# Patient Record
Sex: MALE | Employment: STUDENT | URBAN - METROPOLITAN AREA
[De-identification: names, ages, dates, MRNs, and addresses within clinical notes are randomized per-mention and may not be internally consistent; named-entity substitution may affect disease eponyms.]

---
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
## 2023-01-17 ENCOUNTER — OFFICE VISIT (OUTPATIENT)
Dept: FAMILY MEDICINE CLINIC | Facility: CLINIC | Age: 18
End: 2023-01-17

## 2023-01-17 VITALS
OXYGEN SATURATION: 98 % | HEIGHT: 68 IN | WEIGHT: 124.2 LBS | HEART RATE: 70 BPM | RESPIRATION RATE: 18 BRPM | TEMPERATURE: 97.3 F | BODY MASS INDEX: 18.82 KG/M2 | DIASTOLIC BLOOD PRESSURE: 60 MMHG | SYSTOLIC BLOOD PRESSURE: 110 MMHG

## 2023-01-17 DIAGNOSIS — R55 NEAR SYNCOPE: Primary | ICD-10-CM

## 2023-01-17 NOTE — LETTER
January 17, 2023     Patient: Carmelo Senior  YOB: 2005  Date of Visit: 1/17/2023      To Whom it May Concern:    Carmelo Senior is under my professional care  Cary Barry was seen in my office on 1/17/2023  Cary Jhonny may return to school/work on 1/18/2023  If you have any questions or concerns, please don't hesitate to call           Sincerely,          Massiel Butler,         CC: No Recipients

## 2023-01-17 NOTE — PROGRESS NOTES
Name: Karly Lopez      : 2005      MRN: [id number]  Encounter Provider: Pedrito Aceves DO  Encounter Date: 2023   Encounter department: [facility]     1  Near syncope  -       Approximately 1-2 hours a no work he began to feel cold and numb like he might pass out  His complete by nausea and headache  Summary symptoms stopped after a nap when he got home however the headache improved after eating the next morning  Patient was able to finish a complete work day which was the full 8 hours  He did state that he would often find his eyes closed and not remember when he close them cetera  He was seated when the symptoms started  -         Some of the symptoms do sound reminiscent of an anxiety component however patient does not have history of anxiety or any significant stressors  - patient had a eaten that morning and did not appear to be at high risk of hypoglycemia nor does he have history of the same  - patient has no cardiac history known however  Cannot rule out an arrhythmia without EKG  -     POCT ECG  Revealed no alarming findings and was consistent with normal sinus rhythm without acute ischemic changes or apparent arrhythmia  -      Patient reassured this may have been a 1 time incident that  May not recur, weight may simply be his 1st migraine is a was a headache and nausea both reported  -       Patient to resume regular visit schedule but call sooner if symptoms occur or new symptoms arise           Subjective      Works as , 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  The other day was a longer work day 8hrs total instead 2-3 normal but symptoms started after 1-2 hours  He felt cold numb felt like going to pass out tired all of a sudden nausea, head ache (L side occipital)  In general symptoms stopped after nap after work  The headache stopped after eating mac and cheese in morning   He takes tylenol or advil before work and had ensure, vitamins - then chicken soup at lunch that day  In general he has no unusual stressors or medical history of relevant significance  He does sometimes take 5 hour energy/concentrated B12 supplements but had not been taking this recently  Did recently meet 1 of his online friends and person they went to date from [employer] there were no unusual exposures reported  Review of Systems   Constitutional: Positive for fatigue  Negative for chills and fever  HENT: Negative for ear pain, rhinorrhea, sore throat and trouble swallowing  Eyes: Negative for pain and visual disturbance  Respiratory: Negative for cough and shortness of breath  Cardiovascular: Negative for chest pain and palpitations  Gastrointestinal: Positive for nausea  Negative for abdominal pain and vomiting  Genitourinary: Negative for dysuria and hematuria  Musculoskeletal: Negative for arthralgias and back pain  Skin: Negative for color change and rash  Neurological: Positive for light-headedness and headaches  Negative for seizures and syncope  All other systems reviewed and are negative  Current Outpatient Medications on File Prior to Visit   Medication Sig   • Melatonin 10 MG CAPS Take 10 mg by mouth   • guanFACINE (TENEX) 1 mg tablet Take 1 mg by mouth every morning 1/2 tab       Objective     /60 (BP Location: Left arm, Patient Position: Sitting, Cuff Size: Standard)   Pulse 70   Temp (!) 97 3 °F (36 3 °C) (Tympanic)   Resp 18   Ht 5' 7 5" (1 715 m)   Wt 56 3 kg (124 lb 3 2 oz)   SpO2 98%   BMI 19 17 kg/m²     Physical Exam  Constitutional:       Appearance: Normal appearance  He is normal weight  He is not ill-appearing, toxic-appearing or diaphoretic  HENT:      Head: Normocephalic and atraumatic  Mouth/Throat:      Mouth: Mucous membranes are moist    Eyes:      Pupils: Pupils are equal, round, and reactive to light  Cardiovascular:      Rate and Rhythm: Normal rate and regular rhythm  Pulses: Normal pulses  Heart sounds: Normal heart sounds  Pulmonary:      Effort: Pulmonary effort is normal       Breath sounds: Normal breath sounds  Abdominal:      Palpations: Abdomen is soft  Tenderness: There is no abdominal tenderness  Musculoskeletal:         General: No deformity  Right lower leg: No edema  Left lower leg: No edema  Skin:     General: Skin is warm and dry  Neurological:      Mental Status: He is alert and oriented to person, place, and time     Psychiatric:         Mood and Affect: Mood normal          Behavior: Behavior normal        Daksha White DO

## 2023-02-13 ENCOUNTER — OFFICE VISIT (OUTPATIENT)
Dept: FAMILY MEDICINE CLINIC | Facility: CLINIC | Age: 18
End: 2023-02-13

## 2023-02-13 VITALS
SYSTOLIC BLOOD PRESSURE: 98 MMHG | HEIGHT: 68 IN | BODY MASS INDEX: 18.41 KG/M2 | OXYGEN SATURATION: 100 % | DIASTOLIC BLOOD PRESSURE: 68 MMHG | HEART RATE: 68 BPM | RESPIRATION RATE: 16 BRPM | WEIGHT: 121.5 LBS | TEMPERATURE: 97.5 F

## 2023-02-13 DIAGNOSIS — R63.6 UNDERWEIGHT: Primary | ICD-10-CM

## 2023-02-19 NOTE — PROGRESS NOTES
[facility] Office visit    Assessment/Plan:     1  Underweight    BMI 18 63, 121 pounds down from 124 pounds on 1/17, BMI percentile 3 86%, denies any gastric symptoms, aversion to food or food insecurity  Counseled patient on importance of gaining weight  Stressed the importance of making time to prepare and eat breakfast before school  Encouraged him to pack snacks such as meal bars that he can take with him to school and work so he never goes for prolonged periods without eating  Counseled on taking in adequate calories, including carbohydrates and proteins  Encouraged him to use a scale to track his weight weekly, and use a calorie counting el to track his intake  Stressed the importance of him taking responsibility for his own health now that he is 21years old  Return in about 3 months (around 5/13/2023) for weight check  Subjective:   HPI  Hazel Rangel is a 21 y o  male with autism who presents with his mom to follow-up for being underweight  Patient has lost 3 pounds since his appointment a month ago  Patient reports eating about 2 meals a day and often skipping breakfast, he denies any food insecurity  For lunch he typically eats what is ever served at school and for dinner he typically eats what ever his mom prepares for him which can include pasta and chicken or some form of protein  After school he sometimes works as a  and his mom will pack and a sandwich to eat while he is there  He denies any nausea or vomiting  He has worked with a nutritionist that has helped him develop a meal plan and he knows what he is supposed to do but does not follow through with the plan  Review of Systems   Constitutional: Negative for activity change, appetite change, chills, fatigue and fever  HENT: Negative for congestion  Respiratory: Negative for shortness of breath  Cardiovascular: Negative for chest pain     Gastrointestinal: Negative for abdominal pain, constipation, diarrhea, nausea and vomiting  Musculoskeletal: Negative for arthralgias  Neurological: Negative for dizziness and headaches  Objective:     BP 98/68 (BP Location: Left arm, Patient Position: Sitting, Cuff Size: Adult)   Pulse 68   Temp 97 5 °F (36 4 °C)   Resp 16   Ht 5' 7 72" (1 72 m)   Wt 55 1 kg (121 lb 8 oz)   SpO2 100%   BMI 18 63 kg/m²      Physical Exam  Constitutional:       General: He is not in acute distress  Appearance: He is not ill-appearing, toxic-appearing or diaphoretic  Comments: Underweight   HENT:      Head: Normocephalic  Mouth/Throat:      Mouth: Mucous membranes are moist    Cardiovascular:      Rate and Rhythm: Normal rate and regular rhythm  Heart sounds: Normal heart sounds  No murmur heard  Pulmonary:      Effort: Pulmonary effort is normal       Breath sounds: Normal breath sounds  Abdominal:      General: Bowel sounds are normal       Tenderness: There is no abdominal tenderness  Skin:     Capillary Refill: Capillary refill takes less than 2 seconds  Neurological:      Mental Status: He is alert and oriented to person, place, and time            ** Please Note: This note has been constructed using a voice recognition system **     Edwin Mae MD  02/19/23  8:47 AM

## 2023-05-24 RX ORDER — ATOMOXETINE 40 MG/1
1 CAPSULE ORAL EVERY MORNING
COMMUNITY
Start: 2021-01-13

## 2023-05-24 RX ORDER — CLONIDINE HYDROCHLORIDE 0.1 MG/1
1 TABLET ORAL NIGHTLY
COMMUNITY
Start: 2021-01-08

## 2023-05-24 RX ORDER — CLINDAMYCIN PHOSPHATE AND BENZOYL PEROXIDE 10; 50 MG/G; MG/G
GEL TOPICAL
COMMUNITY
Start: 2020-11-05

## 2023-05-24 RX ORDER — HYDROXYZINE HYDROCHLORIDE 10 MG/1
1 TABLET, FILM COATED ORAL 2 TIMES DAILY PRN
COMMUNITY
Start: 2020-12-14

## 2023-05-24 RX ORDER — GUANFACINE 1 MG/1
1 TABLET, EXTENDED RELEASE ORAL NIGHTLY
COMMUNITY
Start: 2021-09-09

## 2023-05-24 RX ORDER — FEXOFENADINE HCL 180 MG/1
1 TABLET ORAL NIGHTLY
COMMUNITY
Start: 2021-11-22

## 2023-06-16 ENCOUNTER — TELEPHONE (OUTPATIENT)
Dept: FAMILY MEDICINE CLINIC | Facility: CLINIC | Age: 18
End: 2023-06-16

## 2023-10-06 ENCOUNTER — APPOINTMENT (EMERGENCY)
Dept: RADIOLOGY | Facility: HOSPITAL | Age: 18
End: 2023-10-06
Payer: COMMERCIAL

## 2023-10-06 ENCOUNTER — HOSPITAL ENCOUNTER (EMERGENCY)
Facility: HOSPITAL | Age: 18
Discharge: HOME | End: 2023-10-06
Attending: STUDENT IN AN ORGANIZED HEALTH CARE EDUCATION/TRAINING PROGRAM
Payer: COMMERCIAL

## 2023-10-06 VITALS
RESPIRATION RATE: 20 BRPM | OXYGEN SATURATION: 100 % | SYSTOLIC BLOOD PRESSURE: 113 MMHG | HEART RATE: 56 BPM | TEMPERATURE: 98.3 F | DIASTOLIC BLOOD PRESSURE: 69 MMHG

## 2023-10-06 VITALS
SYSTOLIC BLOOD PRESSURE: 105 MMHG | WEIGHT: 127.43 LBS | HEART RATE: 66 BPM | BODY MASS INDEX: 19.54 KG/M2 | DIASTOLIC BLOOD PRESSURE: 60 MMHG | RESPIRATION RATE: 20 BRPM | TEMPERATURE: 98.5 F | OXYGEN SATURATION: 100 %

## 2023-10-06 DIAGNOSIS — T79.7XXA SUBCUTANEOUS EMPHYSEMA, INITIAL ENCOUNTER (HCC): Primary | ICD-10-CM

## 2023-10-06 DIAGNOSIS — M65.9 TENOSYNOVITIS OF FINGER: Primary | ICD-10-CM

## 2023-10-06 LAB
ANION GAP SERPL CALCULATED.3IONS-SCNC: 7 MMOL/L
BASOPHILS # BLD AUTO: 0.03 THOUSANDS/ÂΜL (ref 0–0.1)
BASOPHILS NFR BLD AUTO: 1 % (ref 0–1)
BUN SERPL-MCNC: 10 MG/DL (ref 5–25)
CALCIUM SERPL-MCNC: 8.6 MG/DL (ref 8.4–10.2)
CHLORIDE SERPL-SCNC: 104 MMOL/L (ref 96–108)
CO2 SERPL-SCNC: 26 MMOL/L (ref 21–32)
CREAT SERPL-MCNC: 0.87 MG/DL (ref 0.6–1.3)
CRP SERPL QL: <1 MG/L
EOSINOPHIL # BLD AUTO: 0.06 THOUSAND/ÂΜL (ref 0–0.61)
EOSINOPHIL NFR BLD AUTO: 1 % (ref 0–6)
ERYTHROCYTE [DISTWIDTH] IN BLOOD BY AUTOMATED COUNT: 13 % (ref 11.6–15.1)
ERYTHROCYTE [SEDIMENTATION RATE] IN BLOOD: <1 MM/HOUR (ref 0–14)
GFR SERPL CREATININE-BSD FRML MDRD: 124 ML/MIN/1.73SQ M
GLUCOSE SERPL-MCNC: 129 MG/DL (ref 65–140)
HCT VFR BLD AUTO: 35.3 % (ref 36.5–49.3)
HGB BLD-MCNC: 12.7 G/DL (ref 12–17)
IMM GRANULOCYTES # BLD AUTO: 0.01 THOUSAND/UL (ref 0–0.2)
IMM GRANULOCYTES NFR BLD AUTO: 0 % (ref 0–2)
LYMPHOCYTES # BLD AUTO: 1.84 THOUSANDS/ÂΜL (ref 0.6–4.47)
LYMPHOCYTES NFR BLD AUTO: 29 % (ref 14–44)
MCH RBC QN AUTO: 31.1 PG (ref 26.8–34.3)
MCHC RBC AUTO-ENTMCNC: 36 G/DL (ref 31.4–37.4)
MCV RBC AUTO: 86 FL (ref 82–98)
MONOCYTES # BLD AUTO: 0.49 THOUSAND/ÂΜL (ref 0.17–1.22)
MONOCYTES NFR BLD AUTO: 8 % (ref 4–12)
NEUTROPHILS # BLD AUTO: 4.03 THOUSANDS/ÂΜL (ref 1.85–7.62)
NEUTS SEG NFR BLD AUTO: 61 % (ref 43–75)
NRBC BLD AUTO-RTO: 0 /100 WBCS
PLATELET # BLD AUTO: 196 THOUSANDS/UL (ref 149–390)
PMV BLD AUTO: 9.6 FL (ref 8.9–12.7)
POTASSIUM SERPL-SCNC: 3.4 MMOL/L (ref 3.5–5.3)
PROCALCITONIN SERPL-MCNC: <0.05 NG/ML
RBC # BLD AUTO: 4.09 MILLION/UL (ref 3.88–5.62)
SODIUM SERPL-SCNC: 137 MMOL/L (ref 135–147)
WBC # BLD AUTO: 6.46 THOUSAND/UL (ref 4.31–10.16)

## 2023-10-06 PROCEDURE — 85652 RBC SED RATE AUTOMATED: CPT | Performed by: STUDENT IN AN ORGANIZED HEALTH CARE EDUCATION/TRAINING PROGRAM

## 2023-10-06 PROCEDURE — 73130 X-RAY EXAM OF HAND: CPT

## 2023-10-06 PROCEDURE — 99283 EMERGENCY DEPT VISIT LOW MDM: CPT

## 2023-10-06 PROCEDURE — 85025 COMPLETE CBC W/AUTO DIFF WBC: CPT | Performed by: STUDENT IN AN ORGANIZED HEALTH CARE EDUCATION/TRAINING PROGRAM

## 2023-10-06 PROCEDURE — 73201 CT UPPER EXTREMITY W/DYE: CPT

## 2023-10-06 PROCEDURE — 99285 EMERGENCY DEPT VISIT HI MDM: CPT | Performed by: STUDENT IN AN ORGANIZED HEALTH CARE EDUCATION/TRAINING PROGRAM

## 2023-10-06 PROCEDURE — 84145 PROCALCITONIN (PCT): CPT | Performed by: STUDENT IN AN ORGANIZED HEALTH CARE EDUCATION/TRAINING PROGRAM

## 2023-10-06 PROCEDURE — G1004 CDSM NDSC: HCPCS

## 2023-10-06 PROCEDURE — 36415 COLL VENOUS BLD VENIPUNCTURE: CPT | Performed by: STUDENT IN AN ORGANIZED HEALTH CARE EDUCATION/TRAINING PROGRAM

## 2023-10-06 PROCEDURE — 80048 BASIC METABOLIC PNL TOTAL CA: CPT | Performed by: STUDENT IN AN ORGANIZED HEALTH CARE EDUCATION/TRAINING PROGRAM

## 2023-10-06 PROCEDURE — 86140 C-REACTIVE PROTEIN: CPT | Performed by: STUDENT IN AN ORGANIZED HEALTH CARE EDUCATION/TRAINING PROGRAM

## 2023-10-06 PROCEDURE — 99284 EMERGENCY DEPT VISIT MOD MDM: CPT

## 2023-10-06 RX ADMIN — IOHEXOL 100 ML: 350 INJECTION, SOLUTION INTRAVENOUS at 21:30

## 2023-10-06 NOTE — DISCHARGE INSTRUCTIONS
You were seen in the emergency department for finger pain. Your exam is most consistent with "nonsuppurative tenosynovitis". Please keep the splint on your finger. You should use ibuprofen or naproxen for pain. Please follow up with hand surgery. Call to schedule an appointment. Return to the ED immediately if you develop any pain, fevers, redness, swelling, or for any other new or concerning symptoms.
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
## 2023-10-07 ENCOUNTER — APPOINTMENT (EMERGENCY)
Dept: RADIOLOGY | Facility: HOSPITAL | Age: 18
End: 2023-10-07
Payer: COMMERCIAL

## 2023-10-07 ENCOUNTER — HOSPITAL ENCOUNTER (EMERGENCY)
Facility: HOSPITAL | Age: 18
Discharge: HOME | End: 2023-10-07
Attending: STUDENT IN AN ORGANIZED HEALTH CARE EDUCATION/TRAINING PROGRAM
Payer: COMMERCIAL

## 2023-10-07 VITALS
BODY MASS INDEX: 19.56 KG/M2 | SYSTOLIC BLOOD PRESSURE: 117 MMHG | DIASTOLIC BLOOD PRESSURE: 58 MMHG | TEMPERATURE: 98.4 F | WEIGHT: 127.6 LBS | RESPIRATION RATE: 18 BRPM | OXYGEN SATURATION: 98 % | HEART RATE: 82 BPM

## 2023-10-07 DIAGNOSIS — T79.7XXA SUBCUTANEOUS EMPHYSEMA, INITIAL ENCOUNTER (HCC): Primary | ICD-10-CM

## 2023-10-07 PROCEDURE — 73130 X-RAY EXAM OF HAND: CPT

## 2023-10-07 PROCEDURE — 99283 EMERGENCY DEPT VISIT LOW MDM: CPT

## 2023-10-07 PROCEDURE — 99284 EMERGENCY DEPT VISIT MOD MDM: CPT | Performed by: STUDENT IN AN ORGANIZED HEALTH CARE EDUCATION/TRAINING PROGRAM

## 2023-10-07 NOTE — DISCHARGE INSTRUCTIONS
You were seen in the emergency department for a check of your finger. It has improved. Please follow-up with hand surgery as discussed. Return to the emergency room immediately for any worsening pain, fevers, worsening air/crepitus, inability to move your finger, or for any other new or concerning symptoms.

## 2023-10-07 NOTE — DISCHARGE INSTRUCTIONS
You were seen in the emergency department for finger pain. Your CT scan shows air. As discussed please follow-up with the hand surgeon as soon as possible. Take Tylenol and Motrin as needed for pain. Return to the emergency department immediately for any worsening of your swelling, redness, fevers, or for any other new or concerning symptoms.

## 2023-10-07 NOTE — ED PROVIDER NOTES
History  Chief Complaint   Patient presents with   • Finger Pain     Here earlier with xray to finger, called to come back due to radiology results     Patient is a 24-year-old male, past medical history including autism, who presents to the emergency department for a recheck of his finger. Patient was just seen earlier today for a brief episode of pain and swelling to his finger. He was found to have subcutaneous emphysema. Case was discussed with orthopedics on-call who recommended symptomatic treatment only. He was discharged. Patient denies any significant changes from earlier today. Denies any further trauma. No fevers or chills. No other complaints or concerns. Prior to Admission Medications   Prescriptions Last Dose Informant Patient Reported? Taking? Melatonin 10 MG CAPS   Yes No   Sig: Take 10 mg by mouth   guanFACINE (TENEX) 1 mg tablet   Yes No   Sig: Take 1 mg by mouth every morning 1/2 tab      Facility-Administered Medications: None       Past Medical History:   Diagnosis Date   • ADHD    • Autism    • Scoliosis        History reviewed. No pertinent surgical history. History reviewed. No pertinent family history. I have reviewed and agree with the history as documented. E-Cigarette/Vaping   • E-Cigarette Use Never User      E-Cigarette/Vaping Substances   • Nicotine No    • THC No    • CBD No    • Flavoring No    • Other No    • Unknown No      Social History     Tobacco Use   • Smoking status: Never   • Smokeless tobacco: Never   Vaping Use   • Vaping Use: Never used   Substance Use Topics   • Alcohol use: Never   • Drug use: Never       Review of Systems   Constitutional: Negative for chills and fever. Musculoskeletal:        Finger pain   Skin: Negative for wound. All other systems reviewed and are negative. Physical Exam  Physical Exam  Vitals and nursing note reviewed. Constitutional:       General: He is not in acute distress.      Appearance: He is well-developed. He is not ill-appearing, toxic-appearing or diaphoretic. HENT:      Head: Normocephalic and atraumatic. Right Ear: External ear normal.      Left Ear: External ear normal.      Nose: Nose normal.   Eyes:      General: Lids are normal. No scleral icterus. Cardiovascular:      Rate and Rhythm: Normal rate and regular rhythm. Pulmonary:      Effort: Pulmonary effort is normal. No respiratory distress. Musculoskeletal:         General: No deformity. Normal range of motion. Cervical back: Normal range of motion and neck supple. Skin:     General: Skin is warm and dry. Comments: Crepitus to the left second digit that extends to the dorsum of the left hand. Full range of motion. No overlying skin changes. No erythema. No warmth. No fluctuance. 2+ radial pulse. Neurological:      General: No focal deficit present. Mental Status: He is alert.    Psychiatric:         Mood and Affect: Mood normal.         Behavior: Behavior normal.         Vital Signs  ED Triage Vitals [10/06/23 2020]   Temperature Pulse Respirations Blood Pressure SpO2   98.5 °F (36.9 °C) 66 20 105/60 100 %      Temp Source Heart Rate Source Patient Position - Orthostatic VS BP Location FiO2 (%)   Tympanic Monitor Sitting Right arm --      Pain Score       No Pain           Vitals:    10/06/23 2020   BP: 105/60   Pulse: 66   Patient Position - Orthostatic VS: Sitting         Visual Acuity      ED Medications  Medications   iohexol (OMNIPAQUE) 350 MG/ML injection (SINGLE-DOSE) 100 mL (100 mL Intravenous Given 10/6/23 2130)       Diagnostic Studies  Results Reviewed     Procedure Component Value Units Date/Time    Basic metabolic panel [[id number]]  (Abnormal) Collected: 10/06/23 2032    Lab Status: Final result Specimen: Blood from Arm, Right Updated: 10/06/23 2118     Sodium 137 mmol/L      Potassium 3.4 mmol/L      Chloride 104 mmol/L      CO2 26 mmol/L      ANION GAP 7 mmol/L      BUN 10 mg/dL Creatinine 0.87 mg/dL      Glucose 129 mg/dL      Calcium 8.6 mg/dL      eGFR 124 ml/min/1.73sq m     Narrative:      [facility] Disease (CKD):   •  Stage 1 with normal or high GFR (GFR > 90 mL/min/1.73 square meters)  •  Stage 2 Mild CKD (GFR = 60-89 mL/min/1.73 square meters)  •  Stage 3A Moderate CKD (GFR = 45-59 mL/min/1.73 square meters)  •  Stage 3B Moderate CKD (GFR = 30-44 mL/min/1.73 square meters)  •  Stage 4 Severe CKD (GFR = 15-29 mL/min/1.73 square meters)  •  Stage 5 End Stage CKD (GFR <15 mL/min/1.73 square meters)  Note: GFR calculation is accurate only with a steady state creatinine    C-reactive protein [[id number]]  (Normal) Collected: 10/06/23 2032    Lab Status: Final result Specimen: Blood from Arm, Right Updated: 10/06/23 2118     CRP <1.0 mg/L     Procalcitonin [[id number]]  (Normal) Collected: 10/06/23 2032    Lab Status: Final result Specimen: Blood from Arm, Right Updated: 10/06/23 2105     Procalcitonin <0.05 ng/ml     Sedimentation rate, automated [[id number]]  (Normal) Collected: 10/06/23 2032    Lab Status: Final result Specimen: Blood from Arm, Right Updated: 10/06/23 2055     Sed Rate <1 mm/hour     CBC and differential [[id number]]  (Abnormal) Collected: 10/06/23 2032    Lab Status: Final result Specimen: Blood from Arm, Right Updated: 10/06/23 2039     WBC 6.46 Thousand/uL      RBC 4.09 Million/uL      Hemoglobin 12.7 g/dL      Hematocrit 35.3 %      MCV 86 fL      MCH 31.1 pg      MCHC 36.0 g/dL      RDW 13.0 %      MPV 9.6 fL      Platelets 509 Thousands/uL      nRBC 0 /100 WBCs      Neutrophils Relative 61 %      Immat GRANS % 0 %      Lymphocytes Relative 29 %      Monocytes Relative 8 %      Eosinophils Relative 1 %      Basophils Relative 1 %      Neutrophils Absolute 4.03 Thousands/µL      Immature Grans Absolute 0.01 Thousand/uL      Lymphocytes Absolute 1.84 Thousands/µL      Monocytes Absolute 0.49 Thousand/µL Eosinophils Absolute 0.06 Thousand/µL      Basophils Absolute 0.03 Thousands/µL                  CT upper extremity w contrast left   Final Result by Sonja Rodriguez MD (10/06 2205)      Subcutaneous soft tissue emphysema within the left hand centered around the left second metacarpal and digit. In the absence of other CT signs to suggest infection and given clinical history findings likely reflect a benign subcutaneous soft tissue    emphysema following a trivial puncture wound or use of pneumatic/industrial equipment. Clinical follow-up for any worsening of clinical symptoms is recommended. Workstation performed: [id number]                    Procedures  Procedures         ED Course  ED Course as of 10/07/23 1914   [facility] Oct 06, 2023   2039 WBC: 6.46   2039 Hemoglobin: 12.7   2056 Sed Rate: <1   2118 Procalcitonin: <0.05   2209 CT upper extremity w contrast left  Subcutaneous soft tissue emphysema within the left hand centered around the left second metacarpal and digit. In the absence of other CT signs to suggest infection and given clinical history findings likely reflect a benign subcutaneous soft tissue   emphysema following a trivial puncture wound or use of pneumatic/industrial equipment. Clinical follow-up for any worsening of clinical symptoms is recommended. 2212 Patient reevaluated. Resting comfortably. Discussed results and findings. Explained no signs of infection. As there is no indication for further work-up or treatment in the emergency department at this time will discharge. Discussed hand surgery follow-up. Return precautions discussed. Patient verbalized understanding and agreed to plan of care. CRAFFT    Flowsheet Row Most Recent Value   CRAFFT Initial Screen: During the past 12 months, did you:    1. Drink any alcohol (more than a few sips)? No Filed at: 10/06/2023 2134   2. Smoke any marijuana or hashish No Filed at: 10/06/2023 2134   3. Use anything else to get high? ("anything else" includes illegal drugs, over the counter and prescription drugs, and things that you sniff or 'anne')? No Filed at: 10/06/2023 2134                                          Medical Decision Making  Patient is a 19 y.o. male who presents to the ED for a reassessment of his finger. Patient is nontoxic, well-appearing. Vitals are stable. On exam he continues to have crepitus of the digit and hand. No signs of infection. Vitals are stable. Although this is likely a benign cause of subcutaneous emphysema (possibly from unnoticed trauma), will further evaluate with labs and CT scan. If imaging and labs are reassuring plan to discharge with close follow-up. Portions of the record may have been created with voice recognition software. Occasional wrong word or "sound a like" substitutions may have occurred due to the inherent limitations of voice recognition software. Read the chart carefully and recognize, using context, where substitutions have occurred. Subcutaneous soft tissue emphysema: acute illness or injury  Amount and/or Complexity of Data Reviewed  External Data Reviewed: radiology and notes. Details: Prior xray reviewed; subq emphysema noted  Labs: ordered. Decision-making details documented in ED Course. Radiology: ordered. Decision-making details documented in ED Course. Risk  OTC drugs. Prescription drug management. Disposition  Final diagnoses:   Subcutaneous soft tissue emphysema     Time reflects when diagnosis was documented in both MDM as applicable and the Disposition within this note     Time User Action Codes Description Comment    10/6/2023 10:13 PM [address] 7XXA] Subcutaneous emphysema, initial encounter ([address])     10/6/2023 10:16 PM Laure, [address] [facility] [address]. 7XXA] Subcutaneous soft tissue emphysema       ED Disposition     ED Disposition   Discharge    Condition   Stable    Date/Time   Fri Oct 6, 2023 10:13 PM Comment   Sharri Moni discharge to home/self care. Follow-up Information     Follow up With Specialties Details Why Contact Info Additional Information    Infolink  Call in 1 day  120 [facility] Emergency Department Emergency Medicine   [address] [address]  1060 [facility] Emergency Department, [address], [facility], [address] [facility], [place] Orthopedic Surgery, Hand Surgery Schedule an appointment as soon as possible for a visit   [address] [place] [address]  [phone]             Discharge Medication List as of 10/6/2023 10:16 PM      CONTINUE these medications which have NOT CHANGED    Details   guanFACINE (TENEX) 1 mg tablet Take 1 mg by mouth every morning 1/2 tab, Historical Med      Melatonin 10 MG CAPS Take 10 mg by mouth, Historical Med             No discharge procedures on file.     PDMP Review     None          ED Provider  Electronically Signed by           Jeancarlos Ford DO  10/07/23 5111

## 2023-10-08 NOTE — ED PROVIDER NOTES
History  Chief Complaint   Patient presents with   • Finger Swelling     Patient here with grandmother. He is very anxious. States " the air bubble are spreading into my hand and I am afraid the air bubbles will go into my blood stream ". No swelling hand , no crepitus. Seen ED yesterday. Patient is a 25-year-old male, past medical history including autism, who presents to the emergency department for evaluation of his finger. Patient has been seen twice since yesterday after being diagnosed with subcutaneous emphysema and tenosynovitis of the left index finger. He states he now feels like the emphysema is moving up his arm. He is concerned it may go to his bloodstream.  Denies any pain. No fevers. No injuries. No other complaints or concerns. Imaging from yesterday reviewed. Initially underwent an x-ray that showed subcutaneous emphysema. Returned and he underwent a CT scan that confirmed subcutaneous emphysema and was thought to be likely secondary to benign cause given there were no signs of infection. Prior to Admission Medications   Prescriptions Last Dose Informant Patient Reported? Taking? Melatonin 10 MG CAPS   Yes No   Sig: Take 10 mg by mouth   guanFACINE (TENEX) 1 mg tablet   Yes No   Sig: Take 1 mg by mouth every morning 1/2 tab      Facility-Administered Medications: None       Past Medical History:   Diagnosis Date   • ADHD    • Autism    • Scoliosis        History reviewed. No pertinent surgical history. History reviewed. No pertinent family history. I have reviewed and agree with the history as documented.     E-Cigarette/Vaping   • E-Cigarette Use Never User      E-Cigarette/Vaping Substances   • Nicotine No    • THC No    • CBD No    • Flavoring No    • Other No    • Unknown No      Social History     Tobacco Use   • Smoking status: Never   • Smokeless tobacco: Never   Vaping Use   • Vaping Use: Never used   Substance Use Topics   • Alcohol use: Never   • Drug use: Never       Review of Systems   Constitutional: Negative for chills and fever. Musculoskeletal:        Finger crepitus   Skin: Negative for wound. All other systems reviewed and are negative. Physical Exam  Physical Exam  Vitals and nursing note reviewed. Constitutional:       General: He is not in acute distress. Appearance: He is well-developed. He is not ill-appearing, toxic-appearing or diaphoretic. HENT:      Head: Normocephalic and atraumatic. Right Ear: External ear normal.      Left Ear: External ear normal.      Nose: Nose normal.   Eyes:      General: Lids are normal. No scleral icterus. Cardiovascular:      Rate and Rhythm: Normal rate and regular rhythm. Pulmonary:      Effort: Pulmonary effort is normal. No respiratory distress. Musculoskeletal:         General: No deformity. Normal range of motion. Cervical back: Normal range of motion and neck supple. Skin:     General: Skin is warm and dry. Comments: Minimal crepitus to the dorsum of the left hand. No crepitus of the finger. Full range of motion. 2+ radial pulse. No overlying skin changes. No fluctuance. No induration. No erythema. Neurological:      General: No focal deficit present. Mental Status: He is alert.    Psychiatric:         Mood and Affect: Mood normal.         Behavior: Behavior normal.         Vital Signs  ED Triage Vitals [10/07/23 1455]   Temperature Pulse Respirations Blood Pressure SpO2   98.4 °F (36.9 °C) 82 18 117/58 98 %      Temp Source Heart Rate Source Patient Position - Orthostatic VS BP Location FiO2 (%)   Tympanic Monitor Sitting Right arm --      Pain Score       No Pain           Vitals:    10/07/23 1455   BP: 117/58   Pulse: 82   Patient Position - Orthostatic VS: Sitting         Visual Acuity      ED Medications  Medications - No data to display    Diagnostic Studies  Results Reviewed     None                 XR hand 3+ views LEFT   ED Interpretation by Sukumar Okeefe Celena Mccollum DO (10/07 1540)   Improving subcutaneous emphysema      Final Result by Sobia Franklin MD (10/07 2101)      Soft tissue emphysema is overall improved since prior radiograph. Workstation performed: [id number]                    Procedures  Procedures         ED Course         CRAFFT    Flowsheet Row Most Recent Value   KAREEMFFKATHRYN Initial Screen: During the past 12 months, did you:    1. Drink any alcohol (more than a few sips)? No Filed at: 10/07/2023 7539   2. Smoke any marijuana or hashish No Filed at: 10/07/2023 7652   3. Use anything else to get high? ("anything else" includes illegal drugs, over the counter and prescription drugs, and things that you sniff or 'anne')? No Filed at: 10/07/2023 1452                                          Medical Decision Making  Patient is a 19 y.o. male who presents to the ED for reevaluation of his finger/hand. Patient is nontoxic, well-appearing. Vitals are stable. On exam he has a small amount of crepitus that has improved since yesterday. Given overall well appearance, and improvement in exam, I suspect this is likely benign cause of subcutaneous emphysema, possibly from unrecognized trauma. Presentation not consistent with NSTI. Plain films were obtained which confirmed improvement of subcutaneous emphysema. Will discharge. Discussed hand surgery follow-up. Return precautions discussed. Patient and guardian voiced understanding and agreed to plan of care. Portions of the record may have been created with voice recognition software. Occasional wrong word or "sound a like" substitutions may have occurred due to the inherent limitations of voice recognition software. Read the chart carefully and recognize, using context, where substitutions have occurred. Subcutaneous emphysema, initial encounter [facility]): acute illness or injury  Amount and/or Complexity of Data Reviewed  External Data Reviewed: radiology and notes.      Details: See HPI  Radiology: ordered and independent interpretation performed. Risk  OTC drugs. Disposition  Final diagnoses:   Subcutaneous emphysema, initial encounter [facility])     Time reflects when diagnosis was documented in both MDM as applicable and the Disposition within this note     Time User Action Codes Description Comment    10/7/2023  3:48 PM Ngozi Walker, Keshawn [address]arrido. 7XXA] Subcutaneous emphysema, initial encounter [facility])       ED Disposition     ED Disposition   Discharge    Condition   Stable    Date/Time   Sat Oct 7, 2023  3:40 PM    Comment   Mague Phelan discharge to home/self care. Follow-up Information     Follow up With Specialties Details Why Contact Info Additional Information    Infolink  Call in 1 day  120 [facility] Emergency Department Emergency Medicine   [address] [address]  1060 [facility] Emergency Department, [address] [facility] [address], [place] [facility], [address] [facility], MD Orthopedic Surgery, Hand Surgery Schedule an appointment as soon as possible for a visit   [address] [address] [address]  [phone]             Discharge Medication List as of 10/7/2023  3:48 PM      CONTINUE these medications which have NOT CHANGED    Details   guanFACINE (TENEX) 1 mg tablet Take 1 mg by mouth every morning 1/2 tab, Historical Med      Melatonin 10 MG CAPS Take 10 mg by mouth, Historical Med             No discharge procedures on file.     PDMP Review     None          ED Provider  Electronically Signed by           Kylie Wills DO  10/07/23 5435

## 2024-04-09 ENCOUNTER — OFFICE VISIT (OUTPATIENT)
Age: 19
End: 2024-04-09

## 2024-04-09 VITALS
BODY MASS INDEX: 19.7 KG/M2 | TEMPERATURE: 98.2 F | HEART RATE: 74 BPM | DIASTOLIC BLOOD PRESSURE: 56 MMHG | HEIGHT: 68 IN | OXYGEN SATURATION: 100 % | SYSTOLIC BLOOD PRESSURE: 118 MMHG | WEIGHT: 130 LBS

## 2024-04-09 DIAGNOSIS — F90.9 ATTENTION DEFICIT HYPERACTIVITY DISORDER (ADHD), UNSPECIFIED ADHD TYPE: Primary | ICD-10-CM

## 2024-04-09 DIAGNOSIS — Z00.00 ANNUAL PHYSICAL EXAM: ICD-10-CM

## 2024-04-09 DIAGNOSIS — F90.9 ATTENTION DEFICIT HYPERACTIVITY DISORDER (ADHD), UNSPECIFIED ADHD TYPE: ICD-10-CM

## 2024-04-09 RX ORDER — GUANFACINE 1 MG/1
1 TABLET ORAL EVERY MORNING
Qty: 30 TABLET | Refills: 0 | Status: SHIPPED | OUTPATIENT
Start: 2024-04-09 | End: 2024-04-11

## 2024-04-09 NOTE — PROGRESS NOTES
ADULT ANNUAL PHYSICAL  [facility]    NAME: Vance Bojorquez  AGE: 18 y.o. SEX: male  : 2005     DATE: 2024     Assessment and Plan:     1. Attention deficit hyperactivity disorder (ADHD), unspecified ADHD type  -Not currently on any ADHD medication reports that he has difficulty concentrating finishing tasks sometimes  - Patient is interested in trying medicine again.  - Patient had previously been on Tenex and desires to begin with a trial of the same  - Tenex ordered and sent to pharmacy    2. Annual physical exam  - as noted        Immunizations and preventive care screenings were discussed with patient today. Appropriate education was printed on patient's after visit summary.       No follow-ups on file.     Chief Complaint:     Chief Complaint   Patient presents with    Physical Exam      History of Present Illness:     Adult Annual Physical   Patient here for a comprehensive physical exam. The patient reports no problems.    Diet and Physical Activity  Diet/Nutrition: well balanced diet, limited junk food, and consuming 3-5 servings of fruits/vegetables daily.   Exercise: 3-4 times a week on average and 30-60 minutes on average.      Depression Screening  PHQ-2/9 Depression Screening    Little interest or pleasure in doing things: 0 - not at all  Feeling down, depressed, or hopeless: 0 - not at all  PHQ-2 Score: 0  PHQ-2 Interpretation: Negative depression screen       General Health  Sleep: sleeps well.   Hearing: normal - bilateral.  Vision: no vision problems.   Dental: regular dental visits and does not brush teeth much at all .        Health  History of STDs?: no.    Advanced Care Planning  Do you have an advanced directive? no  Do you have a durable medical power of ? no  ACP document given to the patient? no     Review of Systems:     Review of Systems   Constitutional:  Negative for chills and fever.    HENT:  Negative for ear pain and sore throat.    Eyes:  Negative for pain and visual disturbance.   Respiratory:  Negative for cough and shortness of breath.    Cardiovascular:  Negative for chest pain and palpitations.   Gastrointestinal:  Negative for abdominal pain and vomiting.   Genitourinary:  Negative for dysuria and hematuria.   Musculoskeletal:  Negative for arthralgias and back pain.   Skin:  Negative for color change and rash.   Neurological:  Negative for seizures and syncope.   Psychiatric/Behavioral:  Positive for decreased concentration.    All other systems reviewed and are negative.     Past Medical History:     Past Medical History:   Diagnosis Date    ADHD     Autism     Scoliosis       Past Surgical History:     History reviewed. No pertinent surgical history.   Social History:     Social History     Socioeconomic History    Marital status: Single     Spouse name: None    Number of children: None    Years of education: None    Highest education level: None   Occupational History    None   Tobacco Use    Smoking status: Never    Smokeless tobacco: Never   Vaping Use    Vaping status: Never Used   Substance and Sexual Activity    Alcohol use: Never    Drug use: Never    Sexual activity: Never   Other Topics Concern    None   Social History Narrative    None     Social Determinants of Health     Financial Resource Strain: Low Risk  (4/9/2024)    Overall Financial Resource Strain (CARDIA)     Difficulty of Paying Living Expenses: Not hard at all   Food Insecurity: No Food Insecurity (4/9/2024)    Hunger Vital Sign     Worried About Running Out of Food in the Last Year: Never true     Ran Out of Food in the Last Year: Never true   Transportation Needs: No Transportation Needs (4/9/2024)    PRAPARE - Transportation     Lack of Transportation (Medical): No     Lack of Transportation (Non-Medical): No   Physical Activity: Not on file   Stress: Not on file   Social Connections: Not on file   Intimate  "Partner Violence: Not on file   Housing Stability: Unknown (4/9/2024)    Housing Stability Vital Sign     Unable to Pay for Housing in the Last Year: No     Number of Places Lived in the Last Year: Not on file     Unstable Housing in the Last Year: No      Family History:     History reviewed. No pertinent family history.   Current Medications:     Current Outpatient Medications   Medication Sig Dispense Refill    guanFACINE (TENEX) 1 mg tablet Take 1 tablet (1 mg total) by mouth every morning 30 tablet 0    Melatonin 10 MG CAPS Take 10 mg by mouth (Patient not taking: Reported on 4/9/2024)       No current facility-administered medications for this visit.      Allergies:     No Known Allergies   Physical Exam:     /56 (BP Location: Left arm, Patient Position: Sitting, Cuff Size: Standard)   Pulse 74   Temp 98.2 °F (36.8 °C) (Tympanic)   Ht 5' 8\" (1.727 m)   Wt 59 kg (130 lb)   SpO2 100%   BMI 19.77 kg/m²     Physical Exam  Vitals and nursing note reviewed.   Constitutional:       General: He is not in acute distress.     Appearance: He is well-developed.   HENT:      Head: Normocephalic and atraumatic.   Eyes:      Conjunctiva/sclera: Conjunctivae normal.   Cardiovascular:      Rate and Rhythm: Normal rate and regular rhythm.      Heart sounds: No murmur heard.  Pulmonary:      Effort: Pulmonary effort is normal. No respiratory distress.      Breath sounds: Normal breath sounds.   Abdominal:      Palpations: Abdomen is soft.      Tenderness: There is no abdominal tenderness.   Musculoskeletal:         General: No swelling.      Cervical back: Neck supple.   Skin:     General: Skin is warm and dry.      Capillary Refill: Capillary refill takes less than 2 seconds.   Neurological:      Mental Status: He is alert.   Psychiatric:         Mood and Affect: Mood normal.          [place], DO   [facility]    "

## 2024-04-11 RX ORDER — GUANFACINE 1 MG/1
1 TABLET ORAL EVERY MORNING
Qty: 30 TABLET | Refills: 0 | Status: SHIPPED | OUTPATIENT
Start: 2024-04-11

## 2024-05-08 DIAGNOSIS — F90.9 ATTENTION DEFICIT HYPERACTIVITY DISORDER (ADHD), UNSPECIFIED ADHD TYPE: ICD-10-CM

## 2024-05-09 RX ORDER — GUANFACINE 1 MG/1
1 TABLET ORAL EVERY MORNING
Qty: 30 TABLET | Refills: 0 | Status: SHIPPED | OUTPATIENT
Start: 2024-05-09

## 2024-05-24 ENCOUNTER — OFFICE VISIT (OUTPATIENT)
Age: 19
End: 2024-05-24

## 2024-05-24 VITALS
WEIGHT: 127.8 LBS | TEMPERATURE: 97.8 F | DIASTOLIC BLOOD PRESSURE: 56 MMHG | HEART RATE: 58 BPM | HEIGHT: 69 IN | BODY MASS INDEX: 18.93 KG/M2 | OXYGEN SATURATION: 100 % | SYSTOLIC BLOOD PRESSURE: 111 MMHG

## 2024-05-24 DIAGNOSIS — F84.0 AUTISM: Primary | ICD-10-CM

## 2024-05-24 PROCEDURE — 99213 OFFICE O/P EST LOW 20 MIN: CPT | Performed by: FAMILY MEDICINE

## 2024-05-25 NOTE — PROGRESS NOTES
Assessment/Plan:      Diagnoses and all orders for this visit:    Autism and ADHD  - Patient follow up for ADHD meds  - Reports he felt fatigued when he tried the Tenex and that only the Adderal helped but that it drained his appetite too much  - Agrees no medication at this time; encouraged to gently push himself and encouraged his family to have confidence as he is a bright and very capable person.        Subjective:     Patient ID: Vance Bojorquez is a 18 y.o. male.    HPI  Patient follow up for ADHD meds. Reports he felt fatigued when he tried the Tenex and that only the Adderal helped but that it drained his appetite too much      Review of Systems   Constitutional:  Negative for chills and fever.   HENT:  Negative for ear pain and sore throat.    Eyes:  Negative for pain and visual disturbance.   Respiratory:  Negative for cough and shortness of breath.    Cardiovascular:  Negative for chest pain and palpitations.   Gastrointestinal:  Negative for abdominal pain and vomiting.   Genitourinary:  Negative for dysuria and hematuria.   Musculoskeletal:  Negative for arthralgias and back pain.   Skin:  Negative for color change and rash.   Neurological:  Negative for seizures and syncope.   All other systems reviewed and are negative.        Objective:     Physical Exam  Constitutional:       General: He is not in acute distress.     Appearance: Normal appearance. He is not toxic-appearing.   HENT:      Head: Normocephalic and atraumatic.      Nose: Nose normal.   Eyes:      Pupils: Pupils are equal, round, and reactive to light.   Cardiovascular:      Rate and Rhythm: Normal rate and regular rhythm.   Pulmonary:      Effort: Pulmonary effort is normal.   Abdominal:      Palpations: Abdomen is soft.      Tenderness: There is no abdominal tenderness.   Musculoskeletal:         General: No deformity.   Skin:     General: Skin is warm and dry.   Neurological:      Mental Status: He is alert and oriented to person,  place, and time.   Psychiatric:         Mood and Affect: Mood normal.

## 2024-07-12 ENCOUNTER — TELEPHONE (OUTPATIENT)
Age: 19
End: 2024-07-12

## 2024-07-12 NOTE — TELEPHONE ENCOUNTER
Dr. Ward    This patient was tried from dr. Adair, you will be his PCP from now on.  Could you please fill this form and if you have a questons please gradmom to [phone] cell she will help if any question.      Scan to the system

## 2024-07-15 ENCOUNTER — TELEPHONE (OUTPATIENT)
Age: 19
End: 2024-07-15

## 2024-07-15 NOTE — TELEPHONE ENCOUNTER
Dr. Ward:    Spoke to his grandmom, she said if you want to speak to her she is willing to explain the situation, [phone].      Also I did an apt with dr. Cortez as early I Could, he works from 9-530pm

## 2024-07-15 NOTE — TELEPHONE ENCOUNTER
Son called again, this paperwork scanned in is super important and she would like it expedited if at all possible.    Thanks!

## 2024-07-15 NOTE — TELEPHONE ENCOUNTER
Dr. Ward    I Spoke to grandmom she said if you will like to speak to her call at [phone]. She can explain the problem and the situation.     Please call her back  [phone]      Aso I schedule and apt with dr. Cortez  7-23-24 800am      He work from 9 to 530pm

## 2024-07-16 NOTE — TELEPHONE ENCOUNTER
Okay, since I do not have access to the Teams phone, when I'm at clinic tomorrow I will give her a call. Thank you for scheduling the apt.    Juanita Ward  PGY1 [facility]

## 2024-07-16 NOTE — TELEPHONE ENCOUNTER
Dr. Ed Cline need an early apt. I scheduled and apt 7-23-24 with dr. Cortez at 8am  moladarius I tried her to understand the need for come to the office and she is his guardian.   I have the paper and will direct to dr. Cortez, let me know if you want to do anything else.  Sorry

## 2024-07-23 ENCOUNTER — OFFICE VISIT (OUTPATIENT)
Age: 19
End: 2024-07-23

## 2024-07-23 VITALS
OXYGEN SATURATION: 100 % | SYSTOLIC BLOOD PRESSURE: 109 MMHG | TEMPERATURE: 98 F | DIASTOLIC BLOOD PRESSURE: 73 MMHG | WEIGHT: 121 LBS | HEART RATE: 57 BPM | HEIGHT: 70 IN | BODY MASS INDEX: 17.32 KG/M2

## 2024-07-23 DIAGNOSIS — F84.0 AUTISM: Primary | ICD-10-CM

## 2024-07-23 DIAGNOSIS — F90.1 ATTENTION DEFICIT HYPERACTIVITY DISORDER (ADHD), PREDOMINANTLY HYPERACTIVE TYPE: ICD-10-CM

## 2024-07-23 DIAGNOSIS — R17 ELEVATED BILIRUBIN: ICD-10-CM

## 2024-07-23 PROCEDURE — 99213 OFFICE O/P EST LOW 20 MIN: CPT | Performed by: FAMILY MEDICINE

## 2024-07-23 NOTE — PATIENT INSTRUCTIONS
"Patient Education     Routine physical for adults   The Basics   Written by the doctors and editors at [facility]   What is a physical? -- A physical is a routine visit, or \"check-up,\" with your doctor. You might also hear it called a \"wellness visit\" or \"preventive visit.\"  During each visit, the doctor will:   Ask about your physical and mental health   Ask about your habits, behaviors, and lifestyle   Do an exam   Give you vaccines if needed   Talk to you about any medicines you take   Give advice about your health   Answer your questions  Getting regular check-ups is an important part of taking care of your health. It can help your doctor find and treat any problems you have. But it's also important for preventing health problems.  A routine physical is different from a \"sick visit.\" A sick visit is when you see a doctor because of a health concern or problem. Since physicals are scheduled ahead of time, you can think about what you want to ask the doctor.  How often should I get a physical? -- It depends on your age and health. In general, for people age 21 years and older:   If you are younger than 50 years, you might be able to get a physical every 3 years.   If you are 50 years or older, your doctor might recommend a physical every year.  If you have an ongoing health condition, like diabetes or high blood pressure, your doctor will probably want to see you more often.  What happens during a physical? -- In general, each visit will include:   Physical exam - The doctor or nurse will check your height, weight, heart rate, and blood pressure. They will also look at your eyes and ears. They will ask about how you are feeling and whether you have any symptoms that bother you.   Medicines - It's a good idea to bring a list of all the medicines you take to each doctor visit. Your doctor will talk to you about your medicines and answer any questions. Tell them if you are having any side effects that bother you. You " "should also tell them if you are having trouble paying for any of your medicines.   Habits and behaviors - This includes:   Your diet   Your exercise habits   Whether you smoke, drink alcohol, or use drugs   Whether you are sexually active   Whether you feel safe at home  Your doctor will talk to you about things you can do to improve your health and lower your risk of health problems. They will also offer help and support. For example, if you want to quit smoking, they can give you advice and might prescribe medicines. If you want to improve your diet or get more physical activity, they can help you with this, too.   Lab tests, if needed - The tests you get will depend on your age and situation. For example, your doctor might want to check your:   Cholesterol   Blood sugar   Iron level   Vaccines - The recommended vaccines will depend on your age, health, and what vaccines you already had. Vaccines are very important because they can prevent certain serious or deadly infections.   Discussion of screening - \"Screening\" means checking for diseases or other health problems before they cause symptoms. Your doctor can recommend screening based on your age, risk, and preferences. This might include tests to check for:   Cancer, such as breast, prostate, cervical, ovarian, colorectal, prostate, lung, or skin cancer   Sexually transmitted infections, such as chlamydia and gonorrhea   Mental health conditions like depression and anxiety  Your doctor will talk to you about the different types of screening tests. They can help you decide which screenings to have. They can also explain what the results might mean.   Answering questions - The physical is a good time to ask the doctor or nurse questions about your health. If needed, they can refer you to other doctors or specialists, too.  Adults older than 65 years often need other care, too. As you get older, your doctor will talk to you about:   How to prevent falling at " home   Hearing or vision tests   Memory testing   How to take your medicines safely   Making sure that you have the help and support you need at home  All topics are updated as new evidence becomes available and our peer review process is complete.  This topic retrieved from [employer] on: May 02, 2024.  Topic 814736 Version 1.0  Release: 32.4.3 - C32.122  © 2024 UpToDate, Inc. and/or its affiliates. All rights reserved.  Consumer Information Use and Disclaimer   Disclaimer: This generalized information is a limited summary of diagnosis, treatment, and/or medication information. It is not meant to be comprehensive and should be used as a tool to help the user understand and/or assess potential diagnostic and treatment options. It does NOT include all information about conditions, treatments, medications, side effects, or risks that may apply to a specific patient. It is not intended to be medical advice or a substitute for the medical advice, diagnosis, or treatment of a health care provider based on the health care provider's examination and assessment of a patient's specific and unique circumstances. Patients must speak with a health care provider for complete information about their health, medical questions, and treatment options, including any risks or benefits regarding use of medications. This information does not endorse any treatments or medications as safe, effective, or approved for treating a specific patient. UpToDate, Inc. and its affiliates disclaim any warranty or liability relating to this information or the use thereof.The use of this information is governed by the Terms of Use, available at https://www.[employer].com/en/know/clinical-effectiveness-terms. 2024© UpToDate, Inc. and its affiliates and/or licensors. All rights reserved.  Copyright   © 2024 UpToDate, Inc. and/or its affiliates. All rights reserved.

## 2024-07-23 NOTE — PROGRESS NOTES
Name: Vance Bojorquez      : 2005      MRN: [id number]  Encounter Provider: Chanel Cortez MD  Encounter Date: 2024   Encounter department: [facility]    Assessment & Plan   1. Autism  Comments:  Pt had DDD forms to be filled out due to [facility] Aged, Blind and disabled program. 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  Orders:  -     Ambulatory Referral to Social Work Care Management Program; Future  2. Elevated bilirubin  -     Bilirubin, Total and Direct; Future  -     Bilirubin, Total and Direct  3. Attention deficit hyperactivity disorder (ADHD), predominantly hyperactive type  Comments:  Chronic, stable  -Continue Tenex daily  Immunizations and preventive care screenings were discussed with patient today. Appropriate education was printed on patient's after visit summary.      BMI Counseling: Body mass index is 17.61 kg/m². The BMI is below normal. Patient advised to gain weight. Rationale for BMI follow-up plan is due to patient being underweight.         History of Present Illness   Pt is a 19 yo male w/pmh of autism, ADHD, mild scoliosis presenting today to fill out forms. Pt's grandmother states that pt qualifies for the ABD (The [facility] aged, blind, disabled program) due to his psychological needs and not financial.     Physical Exam  Constitutional:       Appearance: Normal appearance.   HENT:      Right Ear: Tympanic membrane normal.      Left Ear: Tympanic membrane normal.      Nose: Nose normal.      Mouth/Throat:      Mouth: Mucous membranes are dry.   Eyes:      Extraocular Movements: Extraocular movements intact.      Pupils: Pupils are equal, round, and reactive to light.   Cardiovascular:      Rate and Rhythm: Normal rate.   Pulmonary:      Effort: Pulmonary effort is normal.   Abdominal:      General: Abdomen is flat.      Palpations: Abdomen is soft.   Musculoskeletal:         General: Normal  "range of motion.      Cervical back: Normal range of motion and neck supple.   Skin:     Capillary Refill: Capillary refill takes less than 2 seconds.   Neurological:      General: No focal deficit present.      Mental Status: He is alert and oriented to person, place, and time.   Psychiatric:         Mood and Affect: Mood normal.         Behavior: Behavior normal.         Thought Content: Thought content normal.         Judgment: Judgment normal.         Review of Systems   Constitutional:  Negative for chills and fever.   HENT:  Negative for ear pain and sore throat.    Eyes:  Negative for pain and visual disturbance.   Respiratory:  Negative for cough and shortness of breath.    Cardiovascular:  Negative for chest pain and palpitations.   Gastrointestinal:  Negative for abdominal pain and vomiting.   Genitourinary:  Negative for dysuria and hematuria.   Musculoskeletal:  Negative for arthralgias and back pain.   Skin:  Negative for color change and rash.   Neurological:  Negative for seizures and syncope.   All other systems reviewed and are negative.      Objective   {Disappearing Hyperlinks   Review Vitals * Enter New Vitals * Results Review * Labs * Imaging * Cardiology * Procedures * Lung Cancer Screening :[id number]}  /73 (BP Location: Right arm, Patient Position: Sitting, Cuff Size: Standard)   Pulse 57   Temp 98 °F (36.7 °C)   Ht 5' 9.5\" (1.765 m)   Wt 54.9 kg (121 lb)   SpO2 100%   BMI 17.61 kg/m²       "

## 2024-08-05 ENCOUNTER — PATIENT OUTREACH (OUTPATIENT)
Age: 19
End: 2024-08-05

## 2024-08-05 NOTE — PROGRESS NOTES
SWCM received referral from provider to assist patient with needs.    SWCM completed chart review. As per chart, Pt w/pmh of autism, ADHD, mild scoliosis. Per chart,  Pt had DDD forms to be filled out due to [facility] Aged, Blind and Disabled program. 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 Per chart,needed documents for patient's application were completed by provider and scanned into chart.     SWCM called patient's grandmother, Lyubov, to follow up and assist with needs.     SWCM spoke to Grandmother. SWCM introduced self, role and reason for outreach. Contact information provided.     Grandmother confirmed required documents were completed. Grandmother reports assisting patient with all his needs. Per grandmother, Pt's grandmother states that pt qualifies for the ABD (The [facility] aged, blind, disabled program) due to his psychological needs. Grandmother reports Pt involved with [employer] and [employer]. Per Grandmother all patient's needs are met. SWCM encouraged Grandmother to call with questions/ needs.    SWCM will close referral as all needs met.

## 2024-10-01 ENCOUNTER — TELEPHONE (OUTPATIENT)
Age: 19
End: 2024-10-01

## 2024-10-01 NOTE — TELEPHONE ENCOUNTER
[facility] - Medical Form for Adults    Scanned copy into encounter    Placed in Dr. Ward's folder in precepting room.    Call when complete: [phone]

## 2025-05-02 ENCOUNTER — TELEPHONE (OUTPATIENT)
Age: 20
End: 2025-05-02

## 2025-05-02 NOTE — TELEPHONE ENCOUNTER
Spoke with patients grandmother, patient established care with [facility]. Please remove Dr. Ward as PCP.

## 2025-08-11 ENCOUNTER — DOCUMENTATION (OUTPATIENT)
Dept: ADMINISTRATIVE | Facility: OTHER | Age: 20
End: 2025-08-11